# Patient Record
Sex: MALE | Race: WHITE | NOT HISPANIC OR LATINO | Employment: OTHER | URBAN - METROPOLITAN AREA
[De-identification: names, ages, dates, MRNs, and addresses within clinical notes are randomized per-mention and may not be internally consistent; named-entity substitution may affect disease eponyms.]

---

## 2019-06-27 ENCOUNTER — OFFICE VISIT (OUTPATIENT)
Dept: CARDIOLOGY CLINIC | Facility: CLINIC | Age: 69
End: 2019-06-27
Payer: MEDICARE

## 2019-06-27 VITALS
HEIGHT: 74 IN | WEIGHT: 223.6 LBS | BODY MASS INDEX: 28.7 KG/M2 | DIASTOLIC BLOOD PRESSURE: 80 MMHG | SYSTOLIC BLOOD PRESSURE: 110 MMHG | HEART RATE: 68 BPM

## 2019-06-27 DIAGNOSIS — I25.10 CORONARY ARTERY DISEASE INVOLVING NATIVE HEART WITHOUT ANGINA PECTORIS, UNSPECIFIED VESSEL OR LESION TYPE: ICD-10-CM

## 2019-06-27 DIAGNOSIS — I25.10 CORONARY ARTERY DISEASE INVOLVING NATIVE HEART WITHOUT ANGINA PECTORIS, UNSPECIFIED VESSEL OR LESION TYPE: Primary | ICD-10-CM

## 2019-06-27 PROCEDURE — 93000 ELECTROCARDIOGRAM COMPLETE: CPT | Performed by: INTERNAL MEDICINE

## 2019-06-27 PROCEDURE — 99214 OFFICE O/P EST MOD 30 MIN: CPT | Performed by: INTERNAL MEDICINE

## 2019-06-27 RX ORDER — CITALOPRAM 20 MG/1
40 TABLET ORAL DAILY
COMMUNITY

## 2019-06-27 RX ORDER — OMEPRAZOLE 20 MG/1
20 CAPSULE, DELAYED RELEASE ORAL DAILY
COMMUNITY

## 2019-06-27 RX ORDER — LEVOTHYROXINE SODIUM 0.05 MG/1
TABLET ORAL
Refills: 3 | COMMUNITY
Start: 2019-06-13

## 2019-06-27 RX ORDER — ATORVASTATIN CALCIUM 80 MG/1
80 TABLET, FILM COATED ORAL DAILY
COMMUNITY

## 2019-06-27 RX ORDER — FLUDROCORTISONE ACETATE 0.1 MG/1
0.1 TABLET ORAL DAILY
Qty: 30 TABLET | Refills: 5 | Status: SHIPPED | OUTPATIENT
Start: 2019-06-27 | End: 2019-06-27 | Stop reason: SDUPTHER

## 2019-06-27 RX ORDER — ASPIRIN 81 MG/1
81 TABLET ORAL DAILY
COMMUNITY

## 2019-06-27 RX ORDER — FLUDROCORTISONE ACETATE 0.1 MG/1
TABLET ORAL
Qty: 90 TABLET | Refills: 2 | Status: SHIPPED | OUTPATIENT
Start: 2019-06-27

## 2019-06-27 RX ORDER — MULTIVITAMIN
1 TABLET ORAL DAILY
COMMUNITY

## 2019-07-26 ENCOUNTER — HOSPITAL ENCOUNTER (OUTPATIENT)
Dept: NON INVASIVE DIAGNOSTICS | Facility: CLINIC | Age: 69
Discharge: HOME | End: 2019-07-26
Payer: MEDICARE

## 2019-07-26 DIAGNOSIS — I25.10 CORONARY ARTERY DISEASE INVOLVING NATIVE HEART WITHOUT ANGINA PECTORIS, UNSPECIFIED VESSEL OR LESION TYPE: ICD-10-CM

## 2019-07-26 LAB
CHEST PAIN STATEMENT: NORMAL
MAX DIASTOLIC BP: 80 MMHG
MAX HEART RATE: 141 BPM
MAX PREDICTED HEART RATE: 152 BPM
MAX. SYSTOLIC BP: 170 MMHG
PROTOCOL NAME: NORMAL
TARGET HR FORMULA: NORMAL
TEST INDICATION: NORMAL
TIME IN EXERCISE PHASE: NORMAL

## 2019-07-26 PROCEDURE — 93306 TTE W/DOPPLER COMPLETE: CPT

## 2019-07-26 PROCEDURE — 93350 STRESS TTE ONLY: CPT

## 2019-07-26 PROCEDURE — 93351 STRESS TTE COMPLETE: CPT | Performed by: INTERNAL MEDICINE

## 2019-07-26 PROCEDURE — 93306 TTE W/DOPPLER COMPLETE: CPT | Performed by: INTERNAL MEDICINE

## 2019-08-01 ENCOUNTER — OFFICE VISIT (OUTPATIENT)
Dept: CARDIOLOGY CLINIC | Facility: CLINIC | Age: 69
End: 2019-08-01
Payer: MEDICARE

## 2019-08-01 VITALS
DIASTOLIC BLOOD PRESSURE: 70 MMHG | WEIGHT: 220 LBS | SYSTOLIC BLOOD PRESSURE: 122 MMHG | OXYGEN SATURATION: 97 % | HEART RATE: 67 BPM | BODY MASS INDEX: 28.23 KG/M2 | HEIGHT: 74 IN

## 2019-08-01 DIAGNOSIS — I25.10 CORONARY ARTERY DISEASE INVOLVING NATIVE HEART WITHOUT ANGINA PECTORIS, UNSPECIFIED VESSEL OR LESION TYPE: Primary | ICD-10-CM

## 2019-08-01 PROCEDURE — 99214 OFFICE O/P EST MOD 30 MIN: CPT | Performed by: INTERNAL MEDICINE

## 2019-08-01 NOTE — PROGRESS NOTES
[address] [facility] [place]  [address]  [facility] [facility]  [phone]    Cardiology Follow up    Patient:  Marcus Stallings  :  1950  MRN:  [id number]    History of Present Illness:     24-year-old man with past medical history of coronary calcifications with calcium score of 280 in the 60th percentile for his age from 2013, orthostatic hypotension, former tobacco use, former heavy alcohol use, hypothyroidism, dyslipidemia presents for cardiology follow up  He denies any chest pain, shortness of breath, palpitations, dizziness, or syncope  Since he started the fludrocortisone over a month ago he has been doing better  For the past 2 weeks he has had no dizzy episodes and he would have several by now  There is no problem list on file for this patient        Past Surgical History  Past Surgical History:   Procedure Laterality Date    ANKLE SURGERY      2017    KIDNEY SURGERY      1974       Social History   Social History     Socioeconomic History    Marital status: /Civil Union     Spouse name: Not on file    Number of children: Not on file    Years of education: Not on file    Highest education level: Not on file   Occupational History    Not on file   Social Needs    Financial resource strain: Not on file    Food insecurity:     Worry: Not on file     Inability: Not on file    Transportation needs:     Medical: Not on file     Non-medical: Not on file   Tobacco Use    Smoking status: Not on file   Substance and Sexual Activity    Alcohol use: Not on file    Drug use: Not on file    Sexual activity: Not on file   Lifestyle    Physical activity:     Days per week: Not on file     Minutes per session: Not on file    Stress: Not on file   Relationships    Social connections:     Talks on phone: Not on file     Gets together: Not on file     Attends [employer] service: Not on file     Active member of club or organization: Not on file     Attends meetings of clubs or organizations: Not on file     Relationship status: Not on file    Intimate partner violence:     Fear of current or ex partner: Not on file     Emotionally abused: Not on file     Physically abused: Not on file     Forced sexual activity: Not on file   Other Topics Concern    Not on file   Social History Narrative    Not on file        No Known Allergies    Family History   Family History   Problem Relation Age of Onset    Hypertension Mother     Coronary artery disease Mother     Cancer Mother     Hyperlipidemia Mother     Hypertension Father     Coronary artery disease Father     Diabetes Father     Sudden death Father         cardiac    Hyperlipidemia Father     Cancer Sister     Sudden death Brother         cardiac       Review of Systems:  Review of Systems   Constitutional: Negative for chills, fatigue and fever  HENT: Negative for hearing loss and trouble swallowing  Eyes: Negative for pain  Respiratory: Negative for cough, chest tightness and shortness of breath  Cardiovascular: Negative for chest pain, palpitations and leg swelling  Gastrointestinal: Negative for abdominal pain, blood in stool, nausea and vomiting  Endocrine: Negative for cold intolerance and heat intolerance  Genitourinary: Negative for difficulty urinating, frequency and hematuria  Musculoskeletal: Negative for arthralgias and neck pain  Skin: Negative for rash  Allergic/Immunologic: Negative for environmental allergies  Neurological: Negative for dizziness, weakness and headaches  Hematological: Does not bruise/bleed easily  Psychiatric/Behavioral: Negative for decreased concentration and sleep disturbance  The patient is not nervous/anxious            Current Outpatient Medications:     aspirin (ECOTRIN LOW STRENGTH) 81 mg EC tablet, Take 81 mg by mouth daily, Disp: , Rfl:     atorvastatin (LIPITOR) 80 mg tablet, Take 80 mg by mouth daily, Disp: , Rfl:     citalopram (CeleXA) 20 mg tablet, Take 40 mg by mouth daily, Disp: , Rfl:     Coenzyme Q10 (COQ10 PO), Take by mouth, Disp: , Rfl:     fludrocortisone (FLORINEF) 0 1 mg tablet, TAKE 1 TABLET(0 1 MG) BY MOUTH DAILY, Disp: 90 tablet, Rfl: 2    levothyroxine 50 mcg tablet, TK 1 T PO D PLUS 1 T EXTRA ONCE WEEKLY, Disp: , Rfl: 3    Multiple Vitamin (MULTIVITAMIN) tablet, Take 1 tablet by mouth daily, Disp: , Rfl:     omeprazole (PriLOSEC) 20 mg delayed release capsule, Take 20 mg by mouth daily, Disp: , Rfl:      Physical Exam:    Vitals:    08/01/19 1355   BP: 122/70   BP Location: Right arm   Patient Position: Sitting   Cuff Size: Adult   Pulse: 67   SpO2: 97%   Weight: 99 8 kg (220 lb)   Height: 6' 2" (1 88 m)       Physical Exam   Constitutional: He is oriented to person, place, and time  He appears well-developed and well-nourished  HENT:   Head: Normocephalic  Right Ear: External ear normal    Left Ear: External ear normal    Mouth/Throat: Oropharynx is clear and moist    Eyes: Pupils are equal, round, and reactive to light  Neck: No JVD present  Carotid bruit is not present  Cardiovascular: Normal rate, regular rhythm and intact distal pulses  Exam reveals no gallop and no friction rub  No murmur heard  Pulmonary/Chest: Effort normal and breath sounds normal  No tachypnea  No respiratory distress  He has no wheezes  He has no rales  He exhibits no tenderness  Abdominal: Soft  He exhibits no distension  There is no tenderness  There is no rebound and no guarding  Musculoskeletal: He exhibits no edema  Neurological: He is alert and oriented to person, place, and time  Skin: Skin is warm and dry  Psychiatric: He has a normal mood and affect  His behavior is normal  Judgment and thought content normal    Nursing note and vitals reviewed  Labs:not applicable    Assessment/Plan:    1  Orthostatic hypotension  We did add fludrocortisone 0 1 mg daily and he is doing much better      2  Coronary calcifications  His stress echocardiogram was normal   His echocardiogram is unremarkable  He is on aspirin/statin  Thank you so much, please not hesitate to contact me with any questions or concerns                Deb Leyva MD  8/1/2019  2:07 PM

## 2019-08-02 ENCOUNTER — APPOINTMENT (OUTPATIENT)
Dept: LAB | Facility: CLINIC | Age: 69
End: 2019-08-02
Payer: MEDICARE

## 2019-08-02 DIAGNOSIS — I25.10 CORONARY ARTERY DISEASE INVOLVING NATIVE HEART WITHOUT ANGINA PECTORIS, UNSPECIFIED VESSEL OR LESION TYPE: ICD-10-CM

## 2019-08-02 LAB
ANION GAP SERPL CALCULATED.3IONS-SCNC: 0 MMOL/L (ref 4–13)
BUN SERPL-MCNC: 14 MG/DL (ref 5–25)
CALCIUM SERPL-MCNC: 9.1 MG/DL (ref 8.3–10.1)
CHLORIDE SERPL-SCNC: 107 MMOL/L (ref 100–108)
CHOLEST SERPL-MCNC: 140 MG/DL (ref 50–200)
CO2 SERPL-SCNC: 33 MMOL/L (ref 21–32)
CREAT SERPL-MCNC: 1.05 MG/DL (ref 0.6–1.3)
GFR SERPL CREATININE-BSD FRML MDRD: 73 ML/MIN/1.73SQ M
GLUCOSE P FAST SERPL-MCNC: 98 MG/DL (ref 65–99)
HDLC SERPL-MCNC: 45 MG/DL (ref 40–60)
LDLC SERPL CALC-MCNC: 66 MG/DL (ref 0–100)
POTASSIUM SERPL-SCNC: 4.7 MMOL/L (ref 3.5–5.3)
SODIUM SERPL-SCNC: 140 MMOL/L (ref 136–145)
TRIGL SERPL-MCNC: 145 MG/DL

## 2019-08-02 PROCEDURE — 36415 COLL VENOUS BLD VENIPUNCTURE: CPT | Performed by: INTERNAL MEDICINE

## 2019-08-02 PROCEDURE — 80061 LIPID PANEL: CPT

## 2019-08-02 PROCEDURE — 83695 ASSAY OF LIPOPROTEIN(A): CPT | Performed by: INTERNAL MEDICINE

## 2019-08-02 PROCEDURE — 80048 BASIC METABOLIC PNL TOTAL CA: CPT | Performed by: INTERNAL MEDICINE

## 2019-08-05 LAB — LPA SERPL-SCNC: <10 NMOL/L

## 2019-08-06 ENCOUNTER — TELEPHONE (OUTPATIENT)
Dept: CARDIOLOGY CLINIC | Facility: CLINIC | Age: 69
End: 2019-08-06

## 2019-08-06 NOTE — TELEPHONE ENCOUNTER
Patient stopped in office asking for results of recent lab results  Lipid panel and lipoprotein  Informed patient levels were all within normal   Would inform Dr Anthony Veliz I gave results    He is not sched for a future appt or on recall list    Does he require a follow up appt

## 2019-08-07 NOTE — TELEPHONE ENCOUNTER
Sam Galloway spoke with him as well  I reinforced he should follow up in 6 months  Thank you      Paulette Jackson